# Patient Record
Sex: FEMALE | Race: WHITE | NOT HISPANIC OR LATINO | Employment: FULL TIME | URBAN - METROPOLITAN AREA
[De-identification: names, ages, dates, MRNs, and addresses within clinical notes are randomized per-mention and may not be internally consistent; named-entity substitution may affect disease eponyms.]

---

## 2017-04-18 ENCOUNTER — TELEPHONE (OUTPATIENT)
Dept: FAMILY MEDICINE | Facility: CLINIC | Age: 52
End: 2017-04-18

## 2017-08-09 DIAGNOSIS — J20.9 ACUTE BRONCHITIS, UNSPECIFIED ORGANISM: ICD-10-CM

## 2017-08-09 RX ORDER — FLUTICASONE FUROATE AND VILANTEROL TRIFENATATE 100; 25 UG/1; UG/1
POWDER RESPIRATORY (INHALATION)
Qty: 60 EACH | Refills: 2 | Status: SHIPPED | OUTPATIENT
Start: 2017-08-09 | End: 2017-11-27 | Stop reason: SDUPTHER

## 2017-08-24 ENCOUNTER — OFFICE VISIT (OUTPATIENT)
Dept: FAMILY MEDICINE | Facility: CLINIC | Age: 52
End: 2017-08-24
Payer: COMMERCIAL

## 2017-08-24 ENCOUNTER — LAB VISIT (OUTPATIENT)
Dept: LAB | Facility: HOSPITAL | Age: 52
End: 2017-08-24
Attending: FAMILY MEDICINE
Payer: COMMERCIAL

## 2017-08-24 VITALS
TEMPERATURE: 98 F | BODY MASS INDEX: 18.16 KG/M2 | SYSTOLIC BLOOD PRESSURE: 154 MMHG | HEART RATE: 86 BPM | HEIGHT: 59 IN | DIASTOLIC BLOOD PRESSURE: 92 MMHG | OXYGEN SATURATION: 99 % | WEIGHT: 90.06 LBS

## 2017-08-24 DIAGNOSIS — F41.8 DEPRESSION WITH ANXIETY: ICD-10-CM

## 2017-08-24 DIAGNOSIS — I10 ESSENTIAL HYPERTENSION: ICD-10-CM

## 2017-08-24 DIAGNOSIS — J30.1 CHRONIC ALLERGIC RHINITIS DUE TO POLLEN, UNSPECIFIED SEASONALITY: ICD-10-CM

## 2017-08-24 DIAGNOSIS — J45.30 MILD PERSISTENT ASTHMA WITHOUT COMPLICATION: ICD-10-CM

## 2017-08-24 DIAGNOSIS — I10 ESSENTIAL HYPERTENSION: Primary | ICD-10-CM

## 2017-08-24 DIAGNOSIS — Z72.0 TOBACCO ABUSE: ICD-10-CM

## 2017-08-24 LAB
ALBUMIN SERPL BCP-MCNC: 4.1 G/DL
ALP SERPL-CCNC: 82 U/L
ALT SERPL W/O P-5'-P-CCNC: 17 U/L
ANION GAP SERPL CALC-SCNC: 10 MMOL/L
AST SERPL-CCNC: 20 U/L
BASOPHILS # BLD AUTO: 0.04 K/UL
BASOPHILS NFR BLD: 0.4 %
BILIRUB SERPL-MCNC: 0.5 MG/DL
BUN SERPL-MCNC: 9 MG/DL
CALCIUM SERPL-MCNC: 9.4 MG/DL
CHLORIDE SERPL-SCNC: 107 MMOL/L
CO2 SERPL-SCNC: 24 MMOL/L
CREAT SERPL-MCNC: 0.7 MG/DL
DIFFERENTIAL METHOD: ABNORMAL
EOSINOPHIL # BLD AUTO: 0.2 K/UL
EOSINOPHIL NFR BLD: 1.9 %
ERYTHROCYTE [DISTWIDTH] IN BLOOD BY AUTOMATED COUNT: 12.9 %
EST. GFR  (AFRICAN AMERICAN): >60 ML/MIN/1.73 M^2
EST. GFR  (NON AFRICAN AMERICAN): >60 ML/MIN/1.73 M^2
GLUCOSE SERPL-MCNC: 81 MG/DL
HCT VFR BLD AUTO: 46.2 %
HGB BLD-MCNC: 16.2 G/DL
LYMPHOCYTES # BLD AUTO: 1.8 K/UL
LYMPHOCYTES NFR BLD: 19.6 %
MCH RBC QN AUTO: 32.7 PG
MCHC RBC AUTO-ENTMCNC: 35.1 G/DL
MCV RBC AUTO: 93 FL
MONOCYTES # BLD AUTO: 0.6 K/UL
MONOCYTES NFR BLD: 6.8 %
NEUTROPHILS # BLD AUTO: 6.6 K/UL
NEUTROPHILS NFR BLD: 71 %
PLATELET # BLD AUTO: 401 K/UL
PMV BLD AUTO: 9.6 FL
POTASSIUM SERPL-SCNC: 3.8 MMOL/L
PROT SERPL-MCNC: 7.1 G/DL
RBC # BLD AUTO: 4.95 M/UL
SODIUM SERPL-SCNC: 141 MMOL/L
TSH SERPL DL<=0.005 MIU/L-ACNC: 1.07 UIU/ML
WBC # BLD AUTO: 9.35 K/UL

## 2017-08-24 PROCEDURE — 3008F BODY MASS INDEX DOCD: CPT | Mod: S$GLB,,, | Performed by: FAMILY MEDICINE

## 2017-08-24 PROCEDURE — 85025 COMPLETE CBC W/AUTO DIFF WBC: CPT

## 2017-08-24 PROCEDURE — 84443 ASSAY THYROID STIM HORMONE: CPT

## 2017-08-24 PROCEDURE — 36415 COLL VENOUS BLD VENIPUNCTURE: CPT | Mod: PO

## 2017-08-24 PROCEDURE — 99214 OFFICE O/P EST MOD 30 MIN: CPT | Mod: S$GLB,,, | Performed by: FAMILY MEDICINE

## 2017-08-24 PROCEDURE — 80053 COMPREHEN METABOLIC PANEL: CPT

## 2017-08-24 PROCEDURE — 99999 PR PBB SHADOW E&M-EST. PATIENT-LVL III: CPT | Mod: PBBFAC,,, | Performed by: FAMILY MEDICINE

## 2017-08-24 PROCEDURE — 3080F DIAST BP >= 90 MM HG: CPT | Mod: S$GLB,,, | Performed by: FAMILY MEDICINE

## 2017-08-24 PROCEDURE — 3077F SYST BP >= 140 MM HG: CPT | Mod: S$GLB,,, | Performed by: FAMILY MEDICINE

## 2017-08-24 RX ORDER — AMLODIPINE BESYLATE 5 MG/1
5 TABLET ORAL DAILY
Qty: 30 TABLET | Refills: 1 | Status: SHIPPED | OUTPATIENT
Start: 2017-08-24 | End: 2017-11-27 | Stop reason: SDUPTHER

## 2017-08-24 RX ORDER — ALBUTEROL SULFATE 90 UG/1
2 AEROSOL, METERED RESPIRATORY (INHALATION) EVERY 6 HOURS PRN
Qty: 18 G | Refills: 5 | Status: SHIPPED | OUTPATIENT
Start: 2017-08-24 | End: 2017-11-27

## 2017-08-24 RX ORDER — LORATADINE 10 MG/1
10 TABLET ORAL DAILY
Refills: 0 | COMMUNITY
Start: 2017-08-24 | End: 2017-08-24

## 2017-08-24 RX ORDER — SERTRALINE HYDROCHLORIDE 50 MG/1
50 TABLET, FILM COATED ORAL DAILY
Qty: 30 TABLET | Refills: 1 | Status: SHIPPED | OUTPATIENT
Start: 2017-08-24 | End: 2017-11-27 | Stop reason: SDUPTHER
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
## 2017-11-27 ENCOUNTER — OFFICE VISIT (OUTPATIENT)
Dept: FAMILY MEDICINE | Facility: CLINIC | Age: 52
End: 2017-11-27
Payer: COMMERCIAL

## 2017-11-27 VITALS
HEIGHT: 59 IN | BODY MASS INDEX: 19.91 KG/M2 | OXYGEN SATURATION: 98 % | HEART RATE: 76 BPM | DIASTOLIC BLOOD PRESSURE: 86 MMHG | SYSTOLIC BLOOD PRESSURE: 134 MMHG | WEIGHT: 98.75 LBS | TEMPERATURE: 98 F

## 2017-11-27 DIAGNOSIS — J45.30 MILD PERSISTENT ASTHMA WITHOUT COMPLICATION: ICD-10-CM

## 2017-11-27 DIAGNOSIS — R41.840 POOR CONCENTRATION: ICD-10-CM

## 2017-11-27 DIAGNOSIS — F41.8 DEPRESSION WITH ANXIETY: ICD-10-CM

## 2017-11-27 DIAGNOSIS — I10 ESSENTIAL HYPERTENSION: Primary | ICD-10-CM

## 2017-11-27 PROCEDURE — 99999 PR PBB SHADOW E&M-EST. PATIENT-LVL III: CPT | Mod: PBBFAC,,, | Performed by: FAMILY MEDICINE

## 2017-11-27 PROCEDURE — 99214 OFFICE O/P EST MOD 30 MIN: CPT | Mod: S$GLB,,, | Performed by: FAMILY MEDICINE

## 2017-11-27 RX ORDER — SERTRALINE HYDROCHLORIDE 50 MG/1
50 TABLET, FILM COATED ORAL DAILY
Qty: 30 TABLET | Refills: 2 | Status: SHIPPED | OUTPATIENT
Start: 2017-11-27 | End: 2018-03-07 | Stop reason: SDUPTHER

## 2017-11-27 RX ORDER — FLUTICASONE FUROATE AND VILANTEROL TRIFENATATE 100; 25 UG/1; UG/1
1 POWDER RESPIRATORY (INHALATION) DAILY
Qty: 60 EACH | Refills: 2 | Status: SHIPPED | OUTPATIENT
Start: 2017-11-27 | End: 2018-03-07 | Stop reason: SDUPTHER

## 2017-11-27 RX ORDER — AMLODIPINE BESYLATE 5 MG/1
5 TABLET ORAL DAILY
Qty: 30 TABLET | Refills: 2 | Status: SHIPPED | OUTPATIENT
Start: 2017-11-27 | End: 2018-03-07 | Stop reason: SDUPTHER

## 2017-11-27 RX ORDER — FLUTICASONE PROPIONATE 50 MCG
2 SPRAY, SUSPENSION (ML) NASAL DAILY PRN
Qty: 16 G | Refills: 5 | Status: SHIPPED | OUTPATIENT
Start: 2017-11-27 | End: 2018-03-07 | Stop reason: SDUPTHER

## 2017-11-27 RX ORDER — ALBUTEROL SULFATE 90 UG/1
2 AEROSOL, METERED RESPIRATORY (INHALATION) EVERY 6 HOURS PRN
Qty: 1 INHALER | Refills: 11 | Status: SHIPPED | OUTPATIENT
Start: 2017-11-27 | End: 2017-12-28 | Stop reason: SDUPTHER

## 2017-11-27 NOTE — PROGRESS NOTES
Subjective:       Patient ID: Any Coulter is a 52 y.o. female.    Chief Complaint: Medication Refill      HPI Comments:       Current Outpatient Prescriptions:     amLODIPine (NORVASC) 5 MG tablet, Take 1 tablet (5 mg total) by mouth once daily., Disp: 30 tablet, Rfl: 2    BREO ELLIPTA 100-25 mcg/dose diskus inhaler, Inhale 1 puff into the lungs once daily. Controller, Disp: 60 each, Rfl: 2    clotrimazole-betamethasone 1-0.05% (LOTRISONE) cream, , Disp: , Rfl:     fluticasone (FLONASE) 50 mcg/actuation nasal spray, 2 sprays by Each Nare route daily as needed for Rhinitis., Disp: 16 g, Rfl: 5    hydrocodone-acetaminophen 7.5-325mg (NORCO) 7.5-325 mg per tablet, , Disp: , Rfl:     sertraline (ZOLOFT) 50 MG tablet, Take 1 tablet (50 mg total) by mouth once daily., Disp: 30 tablet, Rfl: 2    SUPREP BOWEL PREP KIT 17.5-3.13-1.6 gram SolR, , Disp: , Rfl:     albuterol 90 mcg/actuation inhaler, Inhale 2 puffs into the lungs every 6 (six) hours as needed for Wheezing. Dispense with spacer., Disp: 1 Inhaler, Rfl: 11      Three-month follow-up.  Patient says she just ran out of her medications even though I saw her approximately 100 days ago she had a 60 day supply of everything.  Says the Zoloft helped her but that she couldn't take the full dose together because it made her sleepy the next morning.  So she broke it  Half, and has been doing a better while taking half tablet in the morning and half tablet in the evening.  She says her moodiness and her crying were better.  She is now back with her .  Today she complains of problems with concentration and focus for the last 5 years which interferes with her ability to perform her job accurately as a .    She's also requesting refills on her albuterol.  She is asking for pro-air by name she feels like it works better than Ventolin.  Also needs refill on her breo, which she uses every day once a day.  She has been tolerating the Norvasc, but of  "course she ran out a few weeks ago according to my records      Review of Systems   Constitutional: Negative for activity change, appetite change and fever.   HENT: Negative for sore throat.    Respiratory: Negative for cough and shortness of breath.    Cardiovascular: Negative for chest pain.   Gastrointestinal: Negative for abdominal pain, diarrhea and nausea.   Genitourinary: Negative for difficulty urinating.   Musculoskeletal: Negative for arthralgias and myalgias.   Neurological: Negative for dizziness and headaches.   Psychiatric/Behavioral: Positive for decreased concentration. Negative for dysphoric mood, self-injury, sleep disturbance and suicidal ideas. The patient is not nervous/anxious.        Objective:      Vitals:    11/27/17 1457   BP: 134/86   Pulse: 76   Temp: 98.1 °F (36.7 °C)   TempSrc: Tympanic   SpO2: 98%   Weight: 44.8 kg (98 lb 12.3 oz)   Height: 4' 11" (1.499 m)   PainSc: 0-No pain     Physical Exam   Constitutional: She is oriented to person, place, and time. She appears well-developed and well-nourished. No distress.   HENT:   Head: Normocephalic.   Mouth/Throat: No oropharyngeal exudate.   Neck: Neck supple. No thyromegaly present.   Cardiovascular: Normal rate, regular rhythm and normal heart sounds.    No murmur heard.  Pulmonary/Chest: Effort normal and breath sounds normal. She has no wheezes. She has no rales.   Abdominal: Soft. She exhibits no distension.   Musculoskeletal: She exhibits no edema.   Lymphadenopathy:     She has no cervical adenopathy.   Neurological: She is alert and oriented to person, place, and time.   Skin: Skin is warm and dry. She is not diaphoretic.   Psychiatric: She has a normal mood and affect. Her behavior is normal. Judgment and thought content normal.   Nursing note and vitals reviewed.      Assessment:       1. Essential hypertension    2. Depression with anxiety    3. Mild persistent asthma without complication    4. Poor concentration        Plan: "   Essential hypertension  Comments:  Refill amlodipine.  Follow-up in 3 months    Depression with anxiety  Comments:  Doing better with Zoloft.  Refill for 3 months.  Follow-up then    Mild persistent asthma without complication  Comments:  Stable on breo daily, and beta agonist prn  Orders:  -     BREO ELLIPTA 100-25 mcg/dose diskus inhaler; Inhale 1 puff into the lungs once daily. Controller  Dispense: 60 each; Refill: 2  -     fluticasone (FLONASE) 50 mcg/actuation nasal spray; 2 sprays by Each Nare route daily as needed for Rhinitis.  Dispense: 16 g; Refill: 5    Poor concentration  Comments:  Contact Information on psychiatric evaluation given    Other orders  -     albuterol 90 mcg/actuation inhaler; Inhale 2 puffs into the lungs every 6 (six) hours as needed for Wheezing. Dispense with spacer.  Dispense: 1 Inhaler; Refill: 11  -     sertraline (ZOLOFT) 50 MG tablet; Take 1 tablet (50 mg total) by mouth once daily.  Dispense: 30 tablet; Refill: 2  -     amLODIPine (NORVASC) 5 MG tablet; Take 1 tablet (5 mg total) by mouth once daily.  Dispense: 30 tablet; Refill: 2

## 2017-12-28 ENCOUNTER — HOSPITAL ENCOUNTER (OUTPATIENT)
Dept: RADIOLOGY | Facility: HOSPITAL | Age: 52
Discharge: HOME | End: 2017-12-28
Attending: NURSE PRACTITIONER
Payer: COMMERCIAL

## 2017-12-28 ENCOUNTER — OFFICE VISIT (OUTPATIENT)
Dept: FAMILY MEDICINE | Facility: CLINIC | Age: 52
End: 2017-12-28
Payer: COMMERCIAL

## 2017-12-28 VITALS
RESPIRATION RATE: 18 BRPM | HEIGHT: 59 IN | SYSTOLIC BLOOD PRESSURE: 131 MMHG | OXYGEN SATURATION: 99 % | WEIGHT: 102.63 LBS | HEART RATE: 75 BPM | DIASTOLIC BLOOD PRESSURE: 83 MMHG | BODY MASS INDEX: 20.69 KG/M2 | TEMPERATURE: 99 F

## 2017-12-28 DIAGNOSIS — J20.9 ACUTE BRONCHITIS, UNSPECIFIED ORGANISM: Primary | ICD-10-CM

## 2017-12-28 DIAGNOSIS — Z72.0 TOBACCO ABUSE: ICD-10-CM

## 2017-12-28 DIAGNOSIS — J20.9 ACUTE BRONCHITIS, UNSPECIFIED ORGANISM: ICD-10-CM

## 2017-12-28 LAB
CTP QC/QA: YES
FLUAV AG NPH QL: NEGATIVE
FLUBV AG NPH QL: NEGATIVE

## 2017-12-28 PROCEDURE — 99999 PR PBB SHADOW E&M-EST. PATIENT-LVL III: CPT | Mod: PBBFAC,,, | Performed by: NURSE PRACTITIONER

## 2017-12-28 PROCEDURE — 94640 AIRWAY INHALATION TREATMENT: CPT | Mod: S$GLB,,, | Performed by: FAMILY MEDICINE

## 2017-12-28 PROCEDURE — 71020 XR CHEST PA AND LATERAL: CPT | Mod: TC,PO

## 2017-12-28 PROCEDURE — 71020 XR CHEST PA AND LATERAL: CPT | Mod: 26,,, | Performed by: RADIOLOGY

## 2017-12-28 PROCEDURE — 96372 THER/PROPH/DIAG INJ SC/IM: CPT | Mod: 59,S$GLB,, | Performed by: FAMILY MEDICINE

## 2017-12-28 PROCEDURE — 87804 INFLUENZA ASSAY W/OPTIC: CPT | Mod: QW,S$GLB,, | Performed by: NURSE PRACTITIONER

## 2017-12-28 PROCEDURE — 99213 OFFICE O/P EST LOW 20 MIN: CPT | Mod: 25,S$GLB,, | Performed by: NURSE PRACTITIONER

## 2017-12-28 RX ORDER — ALBUTEROL SULFATE 0.83 MG/ML
2.5 SOLUTION RESPIRATORY (INHALATION)
Status: COMPLETED | OUTPATIENT
Start: 2017-12-28 | End: 2017-12-28

## 2017-12-28 RX ORDER — ALBUTEROL SULFATE 90 UG/1
2 AEROSOL, METERED RESPIRATORY (INHALATION) EVERY 6 HOURS PRN
Qty: 1 INHALER | Refills: 11 | Status: SHIPPED | OUTPATIENT
Start: 2017-12-28 | End: 2018-03-07

## 2017-12-28 RX ORDER — KETOROLAC TROMETHAMINE 30 MG/ML
60 INJECTION, SOLUTION INTRAMUSCULAR; INTRAVENOUS
Status: COMPLETED | OUTPATIENT
Start: 2017-12-28 | End: 2017-12-28

## 2017-12-28 RX ORDER — PREDNISONE 20 MG/1
TABLET ORAL
Qty: 20 TABLET | Refills: 0 | Status: SHIPPED | OUTPATIENT
Start: 2017-12-28

## 2017-12-28 RX ADMIN — KETOROLAC TROMETHAMINE 60 MG: 30 INJECTION, SOLUTION INTRAMUSCULAR; INTRAVENOUS at 05:12

## 2017-12-28 RX ADMIN — ALBUTEROL SULFATE 2.5 MG: 0.83 SOLUTION RESPIRATORY (INHALATION) at 05:12

## 2017-12-29 NOTE — PROGRESS NOTES
"CC:   Chief Complaint   Patient presents with    Sore Throat    Cough    Ear Fullness     HPI: This is a new problem.   Any Coulter is a 52 y.o. female with a complaint of URI.  The current episode started in the past 5 days.   The problem has been gradually worsening.   Associated symptoms included chills, nasal congestion, cough, myalgia.    Pertinent negatives include chest pain, dyspnea   Treatments tried: none has been used and this has provided no relief.     [unfilled]  Outpatient Medications Prior to Visit   Medication Sig Dispense Refill    amLODIPine (NORVASC) 5 MG tablet Take 1 tablet (5 mg total) by mouth once daily. 30 tablet 2    BREO ELLIPTA 100-25 mcg/dose diskus inhaler Inhale 1 puff into the lungs once daily. Controller 60 each 2    fluticasone (FLONASE) 50 mcg/actuation nasal spray 2 sprays by Each Nare route daily as needed for Rhinitis. 16 g 5    clotrimazole-betamethasone 1-0.05% (LOTRISONE) cream       hydrocodone-acetaminophen 7.5-325mg (NORCO) 7.5-325 mg per tablet       sertraline (ZOLOFT) 50 MG tablet Take 1 tablet (50 mg total) by mouth once daily. 30 tablet 2    albuterol 90 mcg/actuation inhaler Inhale 2 puffs into the lungs every 6 (six) hours as needed for Wheezing. Dispense with spacer. 1 Inhaler 11    SUPREP BOWEL PREP KIT 17.5-3.13-1.6 gram SolR        No facility-administered medications prior to visit.         Physical Exam   /83   Pulse 75   Temp 99 °F (37.2 °C) (Tympanic)   Resp 18   Ht 4' 11" (1.499 m)   Wt 46.6 kg (102 lb 10 oz)   SpO2 99%   BMI 20.73 kg/m²   Constitutional: The patient appears well-developed and well-nourished.   Head: Normocephalic and atraumatic.   Right Ear: Tympanic membrane and ear canal normal. No drainage, swelling or tenderness. Tympanic membrane is not injected, not erythematous and not bulging.   Left Ear: Ear canal normal. No drainage, swelling or tenderness. Tympanic membrane is not injected, not erythematous and not " bulging.   Nose: Mucosal edema and rhinorrhea present. No sinus tenderness on palpation  Mouth/Throat: Uvula is midline. Posterior oropharyngeal erythema present. No oropharyngeal exudate.        THE MUCOSA IS BOGGY AND ERYTHEMATOUS.     Eyes: Conjunctivae normal and lids are normal. Pupils are equal, round, and reactive to light. Right eye exhibits no discharge. Left eye exhibits no discharge. Right eye exhibits normal extraocular motion. Left eye exhibits normal extraocular motion.   Neck: Trachea normal and normal range of motion. Neck supple. No tracheal tenderness present. No mass and no thyromegaly present.   Cardiovascular: Normal rate, regular rhythm, S1 normal, S2 normal and normal heart sounds.  Exam reveals no gallop, no S3, no S4 and no friction rub.    No murmur heard.  Pulmonary/Chest: Effort normal and breath sounds normal. No stridor. Not tachypneic. No respiratory distress. The patient has no wheezes. The patient has no rhonchi. The patient has no rales.   Skin: The patient is not diaphoretic.     Encounter Diagnoses   Name Primary?    Acute bronchitis, unspecified organism Yes    Tobacco abuse        PLAN:    Any was seen today for sore throat, cough and ear fullness.    Diagnoses and all orders for this visit:    Acute bronchitis, unspecified organism  -     X-Ray Chest PA And Lateral; Future  -     albuterol nebulizer solution 2.5 mg; Take 3 mLs (2.5 mg total) by nebulization one time.  -     albuterol 90 mcg/actuation inhaler; Inhale 2 puffs into the lungs every 6 (six) hours as needed for Wheezing. Dispense with spacer.  -     predniSONE (DELTASONE) 20 MG tablet; Take 3 tab daily for 3 days; then take 2 tab daily for 3 day; then take 1 tab daily for 3 days; then take 0.5 tab daily for 4 days  -     POCT Influenza A/B  -     ketorolac injection 60 mg; Inject 2 mLs (60 mg total) into the muscle one time.  -symptomatic treatment discussed. Verbalized understanding  Tobacco abuse  -smoking  "cessation      Medications Ordered This Encounter      albuterol 90 mcg/actuation inhaler          Sig: Inhale 2 puffs into the lungs every 6 (six) hours as needed for Wheezing. Dispense with spacer.          Dispense:  1 Inhaler          Refill:  11          Pt requests "PROAIR"      albuterol nebulizer solution 2.5 mg      ketorolac injection 60 mg      predniSONE (DELTASONE) 20 MG tablet          Sig: Take 3 tab daily for 3 days; then take 2 tab daily for 3 day; then take 1 tab daily for 3 days; then take 0.5 tab daily for 4 days          Dispense:  20 tablet          Refill:  0  Orders Placed This Encounter   Procedures    X-Ray Chest PA And Lateral     Standing Status:   Future     Number of Occurrences:   1     Standing Expiration Date:   12/28/2018     Order Specific Question:   May the Radiologist modify the order per protocol to meet the clinical needs of the patient?     Answer:   Yes    POCT Influenza A/B     RTC if symptoms are worsening or changing significantly or if not improved by the end of therapy.    "

## 2018-03-01 ENCOUNTER — TELEPHONE (OUTPATIENT)
Dept: FAMILY MEDICINE | Facility: CLINIC | Age: 53
End: 2018-03-01

## 2018-03-07 ENCOUNTER — OFFICE VISIT (OUTPATIENT)
Dept: FAMILY MEDICINE | Facility: CLINIC | Age: 53
End: 2018-03-07
Payer: COMMERCIAL

## 2018-03-07 ENCOUNTER — TELEPHONE (OUTPATIENT)
Dept: FAMILY MEDICINE | Facility: CLINIC | Age: 53
End: 2018-03-07

## 2018-03-07 VITALS
OXYGEN SATURATION: 99 % | HEART RATE: 87 BPM | BODY MASS INDEX: 19.98 KG/M2 | HEIGHT: 59 IN | DIASTOLIC BLOOD PRESSURE: 84 MMHG | SYSTOLIC BLOOD PRESSURE: 140 MMHG | WEIGHT: 99.13 LBS | TEMPERATURE: 99 F

## 2018-03-07 DIAGNOSIS — J45.30 MILD PERSISTENT ASTHMA WITHOUT COMPLICATION: ICD-10-CM

## 2018-03-07 DIAGNOSIS — F41.8 DEPRESSION WITH ANXIETY: ICD-10-CM

## 2018-03-07 DIAGNOSIS — J20.9 ACUTE BRONCHITIS, UNSPECIFIED ORGANISM: Primary | ICD-10-CM

## 2018-03-07 DIAGNOSIS — Z72.0 TOBACCO ABUSE: ICD-10-CM

## 2018-03-07 DIAGNOSIS — I10 ESSENTIAL HYPERTENSION: ICD-10-CM

## 2018-03-07 PROCEDURE — 99214 OFFICE O/P EST MOD 30 MIN: CPT | Mod: 25,S$GLB,, | Performed by: FAMILY MEDICINE

## 2018-03-07 PROCEDURE — 3077F SYST BP >= 140 MM HG: CPT | Mod: S$GLB,,, | Performed by: FAMILY MEDICINE

## 2018-03-07 PROCEDURE — 3079F DIAST BP 80-89 MM HG: CPT | Mod: S$GLB,,, | Performed by: FAMILY MEDICINE

## 2018-03-07 PROCEDURE — 99999 PR PBB SHADOW E&M-EST. PATIENT-LVL III: CPT | Mod: PBBFAC,,, | Performed by: FAMILY MEDICINE

## 2018-03-07 PROCEDURE — 96372 THER/PROPH/DIAG INJ SC/IM: CPT | Mod: 59,S$GLB,, | Performed by: FAMILY MEDICINE

## 2018-03-07 RX ORDER — FLUTICASONE FUROATE AND VILANTEROL TRIFENATATE 100; 25 UG/1; UG/1
1 POWDER RESPIRATORY (INHALATION) DAILY
Qty: 60 EACH | Refills: 2 | Status: SHIPPED | OUTPATIENT
Start: 2018-03-07

## 2018-03-07 RX ORDER — FLUTICASONE PROPIONATE 50 MCG
2 SPRAY, SUSPENSION (ML) NASAL DAILY PRN
Qty: 16 G | Refills: 5 | Status: SHIPPED | OUTPATIENT
Start: 2018-03-07

## 2018-03-07 RX ORDER — LORATADINE 10 MG/1
10 TABLET ORAL DAILY
Qty: 30 TABLET | Refills: 1 | COMMUNITY
Start: 2018-03-07 | End: 2019-03-07

## 2018-03-07 RX ORDER — BETAMETHASONE SODIUM PHOSPHATE AND BETAMETHASONE ACETATE 3; 3 MG/ML; MG/ML
6 INJECTION, SUSPENSION INTRA-ARTICULAR; INTRALESIONAL; INTRAMUSCULAR; SOFT TISSUE
Status: COMPLETED | OUTPATIENT
Start: 2018-03-07 | End: 2018-03-07

## 2018-03-07 RX ORDER — AMLODIPINE BESYLATE 5 MG/1
5 TABLET ORAL DAILY
Qty: 30 TABLET | Refills: 2 | Status: SHIPPED | OUTPATIENT
Start: 2018-03-07 | End: 2019-03-07

## 2018-03-07 RX ORDER — PROMETHAZINE HYDROCHLORIDE AND DEXTROMETHORPHAN HYDROBROMIDE 6.25; 15 MG/5ML; MG/5ML
5 SYRUP ORAL 3 TIMES DAILY PRN
Qty: 118 ML | Refills: 0 | Status: SHIPPED | OUTPATIENT
Start: 2018-03-07 | End: 2018-03-17

## 2018-03-07 RX ORDER — AMOXICILLIN 875 MG/1
875 TABLET, FILM COATED ORAL 2 TIMES DAILY
Qty: 20 TABLET | Refills: 0 | Status: SHIPPED | OUTPATIENT
Start: 2018-03-07 | End: 2018-03-17

## 2018-03-07 RX ORDER — ALBUTEROL SULFATE 90 UG/1
2 AEROSOL, METERED RESPIRATORY (INHALATION) EVERY 6 HOURS PRN
Qty: 18 G | Refills: 0 | Status: SHIPPED | OUTPATIENT
Start: 2018-03-07 | End: 2019-03-07

## 2018-03-07 RX ORDER — SERTRALINE HYDROCHLORIDE 50 MG/1
50 TABLET, FILM COATED ORAL DAILY
Qty: 30 TABLET | Refills: 2 | Status: SHIPPED | OUTPATIENT
Start: 2018-03-07 | End: 2019-03-07

## 2018-03-07 RX ADMIN — BETAMETHASONE SODIUM PHOSPHATE AND BETAMETHASONE ACETATE 6 MG: 3; 3 INJECTION, SUSPENSION INTRA-ARTICULAR; INTRALESIONAL; INTRAMUSCULAR; SOFT TISSUE at 02:03

## 2018-03-07 NOTE — LETTER
[facility]  [address] [address]  Phone: [phone]  Fax: [phone] March 7, 2018     Patient: Any Coulter    YOB: 1965   Date of Visit: 3/7/2018       To Whom It May Concern:     Dr. Duncan Ojeda is requesting a copy of pt's medical record  Please fax records to [phone]    If you have any questions or concerns, please don't hesitate to call.    Sincerely,  Brianna K., [facility]  Phone #[phone] Fax # [phone]

## 2018-03-07 NOTE — PROGRESS NOTES
Subjective:       Patient ID: Any Coulter is a 53 y.o. female.    Chief Complaint: Sinus Problem      HPI Comments:       Current Outpatient Prescriptions:     amLODIPine (NORVASC) 5 MG tablet, Take 1 tablet (5 mg total) by mouth once daily., Disp: 30 tablet, Rfl: 2    BREO ELLIPTA 100-25 mcg/dose diskus inhaler, Inhale 1 puff into the lungs once daily. Controller, Disp: 60 each, Rfl: 2    clotrimazole-betamethasone 1-0.05% (LOTRISONE) cream, , Disp: , Rfl:     fluticasone (FLONASE) 50 mcg/actuation nasal spray, 2 sprays (100 mcg total) by Each Nare route daily as needed for Rhinitis., Disp: 16 g, Rfl: 5    hydrocodone-acetaminophen 7.5-325mg (NORCO) 7.5-325 mg per tablet, , Disp: , Rfl:     predniSONE (DELTASONE) 20 MG tablet, Take 3 tab daily for 3 days; then take 2 tab daily for 3 day; then take 1 tab daily for 3 days; then take 0.5 tab daily for 4 days, Disp: 20 tablet, Rfl: 0    sertraline (ZOLOFT) 50 MG tablet, Take 1 tablet (50 mg total) by mouth once daily., Disp: 30 tablet, Rfl: 2    albuterol 90 mcg/actuation inhaler, Inhale 2 puffs into the lungs every 6 (six) hours as needed for Wheezing. Rescue, Disp: 18 g, Rfl: 0    loratadine (CLARITIN) 10 mg tablet, Take 1 tablet (10 mg total) by mouth once daily., Disp: 30 tablet, Rfl: 1      She is a smoker who has almost continuous respiratory symptoms, stating that she's not been asymptomatic since seeing me in November or seeing another provider here for respiratory infection in December.  However the last 5 days she's had increased coughing and congestion with green rhythm, low-grade fever.  Needs refills on some medications.      Review of Systems   Constitutional: Positive for fatigue. Negative for activity change, appetite change and fever.   HENT: Positive for congestion, postnasal drip, rhinorrhea and sinus pressure. Negative for sore throat.    Respiratory: Positive for cough. Negative for shortness of breath.    Cardiovascular: Negative for  "chest pain.   Gastrointestinal: Negative for abdominal pain, diarrhea and nausea.   Genitourinary: Negative for difficulty urinating.   Musculoskeletal: Negative for arthralgias and myalgias.   Neurological: Negative for dizziness and headaches.       Objective:      Vitals:    03/07/18 1412 03/07/18 1431   BP: (!) 144/90 (!) 140/84   Pulse: 87    Temp: 99.1 °F (37.3 °C)    TempSrc: Tympanic    SpO2: 99%    Weight: 45 kg (99 lb 1.6 oz)    Height: 4' 11" (1.499 m)      Physical Exam   Constitutional: She is oriented to person, place, and time. She appears well-developed and well-nourished.  Non-toxic appearance. She appears ill. No distress.   HENT:   Head: Normocephalic.   Right Ear: Tympanic membrane, external ear and ear canal normal.   Left Ear: Tympanic membrane, external ear and ear canal normal.   Nose: Mucosal edema present. No rhinorrhea.   Mouth/Throat: Mucous membranes are dry. Posterior oropharyngeal edema present. No oropharyngeal exudate or posterior oropharyngeal erythema.   Neck: Neck supple. No thyromegaly present.   Cardiovascular: Normal rate, regular rhythm and normal heart sounds.    No murmur heard.  Pulmonary/Chest: Effort normal. She has wheezes. She has no rales.   Abdominal: Soft. She exhibits no distension.   Musculoskeletal: She exhibits no edema.   Lymphadenopathy:     She has no cervical adenopathy.   Neurological: She is alert and oriented to person, place, and time.   Skin: Skin is warm and dry. She is not diaphoretic.   Psychiatric: She has a normal mood and affect. Her behavior is normal. Judgment and thought content normal.   Nursing note and vitals reviewed.      Assessment:       1. Acute bronchitis, unspecified organism    2. Mild persistent asthma without complication    3. Tobacco abuse    4. Depression with anxiety    5. Essential hypertension        Plan:   Acute bronchitis, unspecified organism  Comments:  Amoxicillin Celestone.  Decrease smoking    Mild persistent asthma " without complication  Comments:  Occasional wheeze on exam now.  No exacerbation currently.  Refills on Brio and pro-air (patient requests namebrand on the latter)  Orders:  -     fluticasone (FLONASE) 50 mcg/actuation nasal spray; 2 sprays (100 mcg total) by Each Nare route daily as needed for Rhinitis.  Dispense: 16 g; Refill: 5  -     BREO ELLIPTA 100-25 mcg/dose diskus inhaler; Inhale 1 puff into the lungs once daily. Controller  Dispense: 60 each; Refill: 2    Tobacco abuse  Comments:  Assistance offered.  We'll discuss further at annual physical in 1 month    Depression with anxiety  Comments:  Fair control.  Refill Zoloft    Essential hypertension  Comments:  High today.  Will follow-up in one month.  We'll likely need increase in medication    Other orders  -     loratadine (CLARITIN) 10 mg tablet; Take 1 tablet (10 mg total) by mouth once daily.  Dispense: 30 tablet; Refill: 1  -     albuterol 90 mcg/actuation inhaler; Inhale 2 puffs into the lungs every 6 (six) hours as needed for Wheezing. Rescue  Dispense: 18 g; Refill: 0  -     betamethasone acetate-betamethasone sodium phosphate injection 6 mg; Inject 1 mL (6 mg total) into the muscle one time.  -     amoxicillin (AMOXIL) 875 MG tablet; Take 1 tablet (875 mg total) by mouth 2 (two) times daily.  Dispense: 20 tablet; Refill: 0  -     amLODIPine (NORVASC) 5 MG tablet; Take 1 tablet (5 mg total) by mouth once daily.  Dispense: 30 tablet; Refill: 2  -     sertraline (ZOLOFT) 50 MG tablet; Take 1 tablet (50 mg total) by mouth once daily.  Dispense: 30 tablet; Refill: 2  -     promethazine-dextromethorphan (PROMETHAZINE-DM) 6.25-15 mg/5 mL Syrp; Take 5 mLs by mouth 3 (three) times daily as needed.  Dispense: 118 mL; Refill: 0

## 2018-03-13 ENCOUNTER — TELEPHONE (OUTPATIENT)
Dept: FAMILY MEDICINE | Facility: CLINIC | Age: 53
End: 2018-03-13

## 2018-03-13 NOTE — LETTER
March 13, 2018    Any Coulter  [address] [place] [address]             [facility]  [address] [place] [address]  Phone: [phone]  Fax: [phone] March 13, 2018    Patient : Any Coulter  YOB: 1965  Date of Visit : 03/07/2018    To Whom It May Concern :     Dr. Ojeda is requesting a copy of the pts entire record.  Please fax records to [phone]    If you have any questions or concerns, please dont hesitate to call.    Sincerely,  Bryanna C. [facility]  Phone # : [phone] Fax #: [phone]

## 2018-06-21 ENCOUNTER — PATIENT OUTREACH (OUTPATIENT)
Dept: ADMINISTRATIVE | Facility: HOSPITAL | Age: 53
End: 2018-06-21

## 2018-06-21 NOTE — PROGRESS NOTES
I spoke with pt that stated she no longer has insurance and can no longer afford to see Dr Ojeda. Pt stated she had an inncendent with a co-worker and was fired over it. Pt stated since then she has felt much better. Pt will seek care with [facility]. Pt stated we can remove Dr Ojeda as PCP and if in the future she can afford insurance she will schedule an annual f/u.

## 2019-11-04 ENCOUNTER — PES CALL (OUTPATIENT)
Dept: ADMINISTRATIVE | Facility: CLINIC | Age: 54
End: 2019-11-04

## 2024-11-07 ENCOUNTER — TELEPHONE (OUTPATIENT)
Dept: OTOLARYNGOLOGY | Facility: CLINIC | Age: 59
End: 2024-11-07
Payer: MEDICARE

## 2024-11-07 ENCOUNTER — OFFICE VISIT (OUTPATIENT)
Dept: OTOLARYNGOLOGY | Facility: CLINIC | Age: 59
End: 2024-11-07
Payer: MEDICARE

## 2024-11-07 ENCOUNTER — OFFICE VISIT (OUTPATIENT)
Dept: FAMILY MEDICINE | Facility: CLINIC | Age: 59
End: 2024-11-07
Payer: COMMERCIAL

## 2024-11-07 VITALS
OXYGEN SATURATION: 97 % | DIASTOLIC BLOOD PRESSURE: 84 MMHG | HEART RATE: 68 BPM | WEIGHT: 100.63 LBS | HEIGHT: 59 IN | BODY MASS INDEX: 20.28 KG/M2 | SYSTOLIC BLOOD PRESSURE: 142 MMHG | TEMPERATURE: 98 F

## 2024-11-07 VITALS — BODY MASS INDEX: 19.6 KG/M2 | HEIGHT: 59 IN | WEIGHT: 97.25 LBS

## 2024-11-07 DIAGNOSIS — R45.89 ANXIETY ABOUT HEALTH: Primary | ICD-10-CM

## 2024-11-07 DIAGNOSIS — F33.1 MODERATE EPISODE OF RECURRENT MAJOR DEPRESSIVE DISORDER: ICD-10-CM

## 2024-11-07 DIAGNOSIS — D36.10 SCHWANNOMA: ICD-10-CM

## 2024-11-07 DIAGNOSIS — R45.86 EMOTIONAL LABILITY: ICD-10-CM

## 2024-11-07 DIAGNOSIS — R51.9 FACIAL PAIN: Primary | ICD-10-CM

## 2024-11-07 DIAGNOSIS — J44.9 CHRONIC OBSTRUCTIVE PULMONARY DISEASE, UNSPECIFIED COPD TYPE: ICD-10-CM

## 2024-11-07 DIAGNOSIS — H90.3 ASYMMETRIC SNHL (SENSORINEURAL HEARING LOSS): ICD-10-CM

## 2024-11-07 DIAGNOSIS — I10 PRIMARY HYPERTENSION: ICD-10-CM

## 2024-11-07 PROCEDURE — 99999 PR PBB SHADOW E&M-EST. PATIENT-LVL V: CPT | Mod: PBBFAC,,, | Performed by: FAMILY MEDICINE

## 2024-11-07 PROCEDURE — 99204 OFFICE O/P NEW MOD 45 MIN: CPT | Mod: HCNC,S$GLB,, | Performed by: OTOLARYNGOLOGY

## 2024-11-07 PROCEDURE — 3008F BODY MASS INDEX DOCD: CPT | Mod: HCNC,CPTII,S$GLB, | Performed by: OTOLARYNGOLOGY

## 2024-11-07 PROCEDURE — 99999 PR PBB SHADOW E&M-EST. PATIENT-LVL III: CPT | Mod: PBBFAC,HCNC,, | Performed by: OTOLARYNGOLOGY

## 2024-11-07 PROCEDURE — 3079F DIAST BP 80-89 MM HG: CPT | Mod: HCNC,CPTII,S$GLB, | Performed by: FAMILY MEDICINE

## 2024-11-07 PROCEDURE — 3008F BODY MASS INDEX DOCD: CPT | Mod: HCNC,CPTII,S$GLB, | Performed by: FAMILY MEDICINE

## 2024-11-07 PROCEDURE — 99204 OFFICE O/P NEW MOD 45 MIN: CPT | Mod: HCNC,S$GLB,, | Performed by: FAMILY MEDICINE

## 2024-11-07 PROCEDURE — 3077F SYST BP >= 140 MM HG: CPT | Mod: HCNC,CPTII,S$GLB, | Performed by: FAMILY MEDICINE

## 2024-11-07 PROCEDURE — 1159F MED LIST DOCD IN RCRD: CPT | Mod: HCNC,CPTII,S$GLB, | Performed by: OTOLARYNGOLOGY

## 2024-11-07 RX ORDER — SERTRALINE HYDROCHLORIDE 50 MG/1
50 TABLET, FILM COATED ORAL DAILY
Qty: 30 TABLET | Refills: 2 | OUTPATIENT
Start: 2024-11-07 | End: 2025-11-07

## 2024-11-07 RX ORDER — ALBUTEROL SULFATE 90 UG/1
2 INHALANT RESPIRATORY (INHALATION) EVERY 6 HOURS PRN
Qty: 18 G | Refills: 0 | Status: SHIPPED | OUTPATIENT
Start: 2024-11-07 | End: 2025-11-07

## 2024-11-07 RX ORDER — AMLODIPINE BESYLATE 5 MG/1
5 TABLET ORAL DAILY
Qty: 30 TABLET | Refills: 2 | Status: SHIPPED | OUTPATIENT
Start: 2024-11-07 | End: 2025-11-07

## 2024-11-07 NOTE — PROGRESS NOTES
Subjective:       Patient ID: Any Coulter is a 59 y.o. female.    Chief Complaint: Cough, Shortness of Breath, Nasal Congestion, and Headache      HPI Comments:       Current Outpatient Medications:     BREO ELLIPTA 100-25 mcg/dose diskus inhaler, Inhale 1 puff into the lungs once daily. Controller, Disp: 60 each, Rfl: 2    clotrimazole-betamethasone 1-0.05% (LOTRISONE) cream, , Disp: , Rfl:     fluticasone (FLONASE) 50 mcg/actuation nasal spray, 2 sprays (100 mcg total) by Each Nare route daily as needed for Rhinitis., Disp: 16 g, Rfl: 5    predniSONE (DELTASONE) 20 MG tablet, Take 3 tab daily for 3 days; then take 2 tab daily for 3 day; then take 1 tab daily for 3 days; then take 0.5 tab daily for 4 days (Patient not taking: Reported on 11/7/2024), Disp: 20 tablet, Rfl: 0    albuterol (PROVENTIL/VENTOLIN HFA) 90 mcg/actuation inhaler, Inhale 2 puffs into the lungs every 6 (six) hours as needed for Wheezing. Rescue, Disp: 18 g, Rfl: 0    amLODIPine (NORVASC) 5 MG tablet, Take 1 tablet (5 mg total) by mouth once daily., Disp: 30 tablet, Rfl: 2    hydrocodone-acetaminophen 7.5-325mg (NORCO) 7.5-325 mg per tablet, , Disp: , Rfl:     loratadine (CLARITIN) 10 mg tablet, Take 1 tablet (10 mg total) by mouth once daily., Disp: 30 tablet, Rfl: 1    sertraline (ZOLOFT) 50 MG tablet, Take 1 tablet (50 mg total) by mouth once daily., Disp: 30 tablet, Rfl: 2      This my 1st time seeing her in over 6 years.  She has been getting her health care elsewhere for the most part.    Today she is very distraught.  Having trouble getting answers to questions she has has a about a facial injury that she sustained in 2019.  She was vacuuming and was pulling on the 2 when it struck her in the nose.  She broke some teeth in his had chronic pain in the facial area ever since.  She thinks it also dislocated her jaw.  She has been seeing chiropractor for some relief for some time.    ENT consult however found other unrelated problems:  "Schwannoma and cholesteatoma.  Various specialties in various times or getting involved, but she is not understanding what comes next and why they can not answer her concerns about pain.  When asked if she could reach out to them she said "no, I am mad at them".  This includes a ENT specialist in [place]    Due to her upset state today was difficult following all the details.  I offered her a 2nd opinion with ENT here at [facility] she accepted.      Chronic symptoms include sinus congestion.  Hearing loss, chronic clear sputum production.  She smokes less than half a pack a day    She is followed by Psychiatry at [facility] has been seen as recently as August.  She is still on the Zoloft that I started her on years ago.  I also reviewed some primary care notes from July.  She is on olmesartan for hypertension, Spiriva Advair for COPD.  Appropriate orders were placed at that time for mammogram, Pap smear, colonoscopy periods unclear whether she had these tests done      Review of Systems   Constitutional:  Negative for activity change, appetite change and fever.   HENT:  Positive for sinus pain. Negative for sore throat.    Respiratory:  Negative for cough and shortness of breath.    Cardiovascular:  Negative for chest pain.   Gastrointestinal:  Negative for abdominal pain, diarrhea and nausea.   Genitourinary:  Negative for difficulty urinating.   Musculoskeletal:  Negative for arthralgias and myalgias.   Neurological:  Negative for dizziness and headaches.   Psychiatric/Behavioral:  Positive for dysphoric mood. The patient is nervous/anxious.        Objective:      Vitals:    11/07/24 1047 11/07/24 1110   BP: (!) 152/76 (!) 142/84   Pulse: 68    Temp: 97.6 °F (36.4 °C)    TempSrc: Tympanic    SpO2: 97%    Weight: 45.7 kg (100 lb 10.2 oz)    Height: 4' 11" (1.499 m)    PainSc: 10-Worst pain ever      Physical Exam  Vitals and nursing note reviewed.   Constitutional:       General: She is not in acute distress.    "  Appearance: She is well-developed. She is not diaphoretic.   HENT:      Head: Normocephalic.      Mouth/Throat:      Pharynx: No oropharyngeal exudate.   Neck:      Thyroid: No thyromegaly.   Cardiovascular:      Rate and Rhythm: Normal rate and regular rhythm.      Heart sounds: Normal heart sounds. No murmur heard.  Pulmonary:      Effort: Pulmonary effort is normal.      Breath sounds: Normal breath sounds. No wheezing or rales.   Abdominal:      General: There is no distension.      Palpations: Abdomen is soft.   Musculoskeletal:      Cervical back: Neck supple.   Lymphadenopathy:      Cervical: No cervical adenopathy.   Skin:     General: Skin is warm and dry.   Neurological:      Mental Status: She is alert and oriented to person, place, and time.   Psychiatric:         Mood and Affect: Affect is tearful.         Speech: Speech is rapid and pressured.         Behavior: Behavior normal.         Thought Content: Thought content normal.         Judgment: Judgment normal.         Assessment:       1. Anxiety about health    2. Chronic obstructive pulmonary disease, unspecified COPD type    3. Primary hypertension    4. Moderate episode of recurrent major depressive disorder    5. Schwannoma        Plan:   Anxiety about health  Comments:  Second opinion on ENT problems.  Continue with psychiatry    Chronic obstructive pulmonary disease, unspecified COPD type  Comments:  Controlled with medications    Primary hypertension  Comments:  Appears Stable.  Due to her emotional state, it is difficult to assess the blood pressure today    Moderate episode of recurrent major depressive disorder  Comments:  Continue psychiatry/Zoloft    Schwannoma  Comments:  Being seen by neurosurgeon.  No longer any good communication.  ENT 2nd opinion  Orders:  -     Ambulatory referral/consult to ENT; Future; Expected date: 11/14/2024

## 2024-11-07 NOTE — PROGRESS NOTES
"Referring Provider:    Duncan Ojeda Md  [address] [place] [address]  Subjective:   Patient: Any Coulter [id number], :1965   Visit date:2024 3:13 PM    Chief Complaint:  Other (Referred by Duncan Ojeda for Schwannoma. Patient c/o being lightheaded and trouble hearing.)    HPI:    Prior notes reviewed by myself.  Clinical documentation obtained by nursing staff reviewed.     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  Her most concerning symptoms is left facial pressure that extends to the top of her head and down her neck. She has long hx of dental work on her upper front teeth and reports tenderness to her upper hard palate.     She also has pustules to the right post auricular area that has been there for about 2 years but is still bothering her and has not resolved with antibiotics. Her recently obtained an audiogram on 3/6/24 which asym hearing loss, has a component of conductive loss on the right and sensorineural on the left. She has hx of right ear surgery as a kid and does not recall details of the surgery. Got fitted for hearing aids but did not use due to tinnitus. Denies otorrhea or otalgia.   Today she is extremely emotionally labile.      Objective:     Physical Exam:  Vitals:  Ht 4' 11" (1.499 m)   Wt 44.1 kg (97 lb 3.6 oz)   BMI 19.64 kg/m²   General appearance:  Well developed, well nourished, intermittently tearful    Ears:  Otoscopy of external auditory canals and tympanic membranes was normal, clinical speech reception thresholds grossly intact, no mass/lesion of auricle.    Nose:  No masses/lesions of external nose, nasal mucosa, septum, and turbinates were within normal limits.    Mouth:  No mass/lesion of lips,  gums, hard/soft palate, tongue, tonsils, or oropharynx. Mixed dentition    Neck & Lymphatics:  No cervical lymphadenopathy, no neck mass/crepitus/ asymmetry, trachea is midline, no thyroid enlargement/tenderness/mass.    Neuro:  CN " II-XII intact bilaterally        [x]  Data Reviewed:    Lab Results   Component Value Date    WBC 8.0 03/22/2024    HGB 15.6 03/22/2024    HCT 46.3 (H) 03/22/2024    MCV 92.3 03/22/2024    EOSINOPHIL 1.9 08/24/2017         [x]  Independent interpretation of test: left CPA lesion consistent with VN  MRI IAC/Temporal Bones W W/O Contrast  Order: [id number]  Impression      Enhancing lesion in the left cerebellopontine angle extending into left internal auditory canal with central cystic change and a 3 mm fundal cap, favor vestibular schwannoma.    Electronically Signed By: Savana Osborn 5/3/2024 3:55 PM CDT  Narrative    EXAM: [facility] MRI IAC W WO CONTRAST    HISTORY: Asymmetric sensorineural hearing loss.    COMPARISON: Same-day temporal bone CT.    TECHNIQUE: Multiplanar multisequence MRI of the brain with special attention to the internal auditory canals performed without and with gadolinium based intravenous contrast: GADOBUTROL 10 MMOL/10 ML (1 MMOL/ML) INTRAVENOUS SOLUTION:10mmol    FINDINGS:    BRAIN: No restricted diffusion to indicate indicate acute infarct. No intracranial hemorrhage. Punctate foci of T2/FLAIR hyperintensity in the supratentorial white matter are nonspecific and within normal limits for patient's age. Proximal intracranial flow voids are maintained.  Ventricles are normal in caliber and configuration.    INTERNAL AUDITORY CANALS including 7th and 8th nerves: 1.1 x 1.0 x 0.6 cm enhancing lesion with central nonenhancement and T2 heterogeneity in the right cerebellopontine angle extending into the right internal auditory canal. There is a 3 mm fundal cap (series 901 image 55). The right facial and vestibulocochlear nerves are unremarkable.  COCHLEA: Normal  VESTIBULE: Normal  SEMICIRCULAR CANALS: Normal  ENDOLYMPHATIC SAC: Not dilated  Exam End: 05/03/24 14:49    Specimen Collected: 05/03/24 15:17 Last Resulted: 05/03/24 15:55   Received From: [facility]  Result Received: 11/07/24 09:59          Discussed with her PCP Dr. Ojeda  Audio not available for review  Reviewed [facility] notes, asymmetric SNHL on audiogram per resident documentation    Assessment & Plan:   Facial pain    Schwannoma  Comments:  Being seen by neurosurgeon.  No longer any good communication.  ENT 2nd opinion  Orders:  -     Ambulatory referral/consult to ENT    Emotional lability        We had a long discussion about her various symptoms.  I explained that her vestibular schwannoma was unlikely to be causing any of her pain.  She states that she has 10/10 pain, so I encouraged her to be seen in the emergency department for pain control.  We encouraged her to schedule an appointment with our neuro otologist regarding her schwannoma, but she insisted on being seen today.  She sees a mental health professional through [facility], but she became combative when I asked her about those visits/issues.  She understands that she can f/u with us when she is ready to discuss next steps for her schwannoma.      Colton Olmstead M.D.  Department of Otolaryngology - Head & Neck Surgery  [id number] [place] [address].  [place] [address]  P: [phone]  F: [phone]        DISCLAIMER: This note was prepared with [employer] voice recognition transcription software. Garbled syntax, mangled pronouns, and other bizarre constructions may be attributed to that software system. While efforts were made to correct any mistakes made by this voice recognition program, some errors and/or omissions may remain in the note that were missed when the note was originally created.

## 2024-11-07 NOTE — Clinical Note
"Hello -  I met with this patient today.  She was extremely emotional, crying and becoming manic at times.  She has a bunch of different complaints that she tries to connect as one issue.  I explained that her schwannoma was not causing any of this widespread pain that she has.   This elaborate story about punching herself in the face while trying to manipulate her vacuum .  She obviously has some significant dental issues, but I can't offer any help there.    I encouraged her to seek treatment at the ER due to her "10/10" pain, but I'm not sure if she is going to.  She seems like she needs some help on her supratentorial issues more than anything else.   We tried to have her meet up with our neurootologist regarding her schwannoma which she has, but she insisted on seeing me today.  She smelled pretty strongly of alcohol today.  Not sure if you have any contact with her mental health professional, but getting her in there seems like the next move.  Colton"

## 2024-11-25 NOTE — TELEPHONE ENCOUNTER
No care due was identified.  [facility] Embedded Care Due Messages. Reference number: [id number].   11/25/2024 5:02:16 PM CST

## 2024-11-25 NOTE — TELEPHONE ENCOUNTER
----- Message from Stacie sent at 11/25/2024 12:42 PM CST -----  .Type:  RX Refill Request    Who Called: .Any Coulter   Refill or New Rx:refill  RX Name and Strength:albuterol (PROVENTIL/VENTOLIN HFA) 90 mcg/actuation inhaler  How is the patient currently taking it? (ex. 1XDay):  Is this a 30 day or 90 day RX:    Preferred Pharmacy with phone number:.  [facility] [address] [facility] [address] [place] [address]  [address] [address] [address]  Phone: [phone] Fax: [phone]     Local or Mail Order:local  Ordering Provider:  Would the patient rather a call back or a response via [facility]? Call back  Best Call Back Number: [phone]  Additional Information:

## 2024-11-26 RX ORDER — ALBUTEROL SULFATE 90 UG/1
2 INHALANT RESPIRATORY (INHALATION) EVERY 6 HOURS PRN
Qty: 18 G | Refills: 3 | Status: SHIPPED | OUTPATIENT
Start: 2024-11-26 | End: 2025-11-26

## 2024-12-13 ENCOUNTER — LAB VISIT (OUTPATIENT)
Dept: LAB | Facility: HOSPITAL | Age: 59
End: 2024-12-13
Attending: FAMILY MEDICINE
Payer: MEDICARE

## 2024-12-13 ENCOUNTER — OFFICE VISIT (OUTPATIENT)
Dept: FAMILY MEDICINE | Facility: CLINIC | Age: 59
End: 2024-12-13
Payer: MEDICARE

## 2024-12-13 VITALS
SYSTOLIC BLOOD PRESSURE: 120 MMHG | DIASTOLIC BLOOD PRESSURE: 78 MMHG | TEMPERATURE: 99 F | BODY MASS INDEX: 18.31 KG/M2 | HEIGHT: 59 IN | WEIGHT: 90.81 LBS | RESPIRATION RATE: 16 BRPM | HEART RATE: 98 BPM | OXYGEN SATURATION: 98 %

## 2024-12-13 DIAGNOSIS — J44.9 CHRONIC OBSTRUCTIVE PULMONARY DISEASE, UNSPECIFIED COPD TYPE: ICD-10-CM

## 2024-12-13 DIAGNOSIS — D36.10 SCHWANNOMA: ICD-10-CM

## 2024-12-13 DIAGNOSIS — I10 PRIMARY HYPERTENSION: ICD-10-CM

## 2024-12-13 DIAGNOSIS — F32.A ANXIETY AND DEPRESSION: Primary | ICD-10-CM

## 2024-12-13 DIAGNOSIS — R51.9 FACIAL PAIN: ICD-10-CM

## 2024-12-13 DIAGNOSIS — R79.9 ABNORMAL FINDING OF BLOOD CHEMISTRY, UNSPECIFIED: ICD-10-CM

## 2024-12-13 DIAGNOSIS — F41.9 ANXIETY AND DEPRESSION: Primary | ICD-10-CM

## 2024-12-13 DIAGNOSIS — R45.89 ANXIETY ABOUT HEALTH: ICD-10-CM

## 2024-12-13 DIAGNOSIS — Z72.0 TOBACCO ABUSE: ICD-10-CM

## 2024-12-13 DIAGNOSIS — I70.0 AORTIC ATHEROSCLEROSIS: ICD-10-CM

## 2024-12-13 LAB
ESTIMATED AVG GLUCOSE: 111 MG/DL (ref 68–131)
HBA1C MFR BLD: 5.5 % (ref 4–5.6)

## 2024-12-13 PROCEDURE — 87389 HIV-1 AG W/HIV-1&-2 AB AG IA: CPT | Mod: HCNC | Performed by: FAMILY MEDICINE

## 2024-12-13 PROCEDURE — 99999 PR PBB SHADOW E&M-EST. PATIENT-LVL III: CPT | Mod: PBBFAC,HCNC,, | Performed by: FAMILY MEDICINE

## 2024-12-13 PROCEDURE — 83036 HEMOGLOBIN GLYCOSYLATED A1C: CPT | Mod: HCNC | Performed by: FAMILY MEDICINE

## 2024-12-13 PROCEDURE — 86803 HEPATITIS C AB TEST: CPT | Mod: HCNC | Performed by: FAMILY MEDICINE

## 2024-12-13 PROCEDURE — 36415 COLL VENOUS BLD VENIPUNCTURE: CPT | Mod: HCNC,PO | Performed by: FAMILY MEDICINE

## 2024-12-13 RX ORDER — SERTRALINE HYDROCHLORIDE 50 MG/1
50 TABLET, FILM COATED ORAL DAILY
Qty: 30 TABLET | Refills: 2 | Status: SHIPPED | OUTPATIENT
Start: 2024-12-13 | End: 2025-12-13

## 2024-12-13 NOTE — PROGRESS NOTES
Subjective:       Patient ID: Any Coulter is a 59 y.o. female.    Chief Complaint: Annual Exam      HPI Comments:       Current Outpatient Medications:     albuterol (PROVENTIL/VENTOLIN HFA) 90 mcg/actuation inhaler, Inhale 2 puffs into the lungs every 6 (six) hours as needed for Wheezing. Rescue, Disp: 18 g, Rfl: 3    amLODIPine (NORVASC) 5 MG tablet, Take 1 tablet (5 mg total) by mouth once daily., Disp: 30 tablet, Rfl: 2    BREO ELLIPTA 100-25 mcg/dose diskus inhaler, Inhale 1 puff into the lungs once daily. Controller, Disp: 60 each, Rfl: 2    clotrimazole-betamethasone 1-0.05% (LOTRISONE) cream, , Disp: , Rfl:     fluticasone (FLONASE) 50 mcg/actuation nasal spray, 2 sprays (100 mcg total) by Each Nare route daily as needed for Rhinitis., Disp: 16 g, Rfl: 5    hydrocodone-acetaminophen 7.5-325mg (NORCO) 7.5-325 mg per tablet, , Disp: , Rfl:     loratadine (CLARITIN) 10 mg tablet, Take 1 tablet (10 mg total) by mouth once daily., Disp: 30 tablet, Rfl: 1    predniSONE (DELTASONE) 20 MG tablet, Take 3 tab daily for 3 days; then take 2 tab daily for 3 day; then take 1 tab daily for 3 days; then take 0.5 tab daily for 4 days (Patient not taking: Reported on 12/13/2024), Disp: 20 tablet, Rfl: 0    sertraline (ZOLOFT) 50 MG tablet, Take 1 tablet (50 mg total) by mouth once daily., Disp: 30 tablet, Rfl: 2      This is our initial follow-up visit.  She establish care with me a month ago.  She reiterates today as she wants me to be her PCP    Today she is accompanied by her daughter who lives nearby    Patient is asking for referral for mental health.  It also refill on her Zoloft.    She apparently had a psychiatrist a few years ago but has not been seeing lately.    Today we only talked tangentially about her complete last time: Facial pain.  Sent her to ENT to get clarification on the nature of her problem.  She has a schwannoma.  She is also being followed by neuro ENT elsewhere    Smokes 1/2 pack per day.    Says  "she is not diabetic    Due for colonoscopy.  Wants to defer awhile longer      Review of Systems   Constitutional:  Negative for activity change, appetite change and fever.   HENT:  Negative for sore throat.    Respiratory:  Negative for cough and shortness of breath.    Cardiovascular:  Negative for chest pain.   Gastrointestinal:  Negative for abdominal pain, diarrhea and nausea.   Genitourinary:  Negative for difficulty urinating.   Musculoskeletal:  Negative for arthralgias and myalgias.   Neurological:  Negative for dizziness and headaches.   Psychiatric/Behavioral:  Positive for dysphoric mood. Negative for self-injury and suicidal ideas. The patient is nervous/anxious.        Objective:      Vitals:    12/13/24 1135   BP: 120/78   Pulse: 98   Resp: 16   Temp: 98.5 °F (36.9 °C)   TempSrc: Tympanic   SpO2: 98%   Weight: 41.2 kg (90 lb 13.3 oz)   Height: 4' 11" (1.499 m)     Physical Exam  Vitals and nursing note reviewed.   Constitutional:       General: She is not in acute distress.     Appearance: She is well-developed. She is not diaphoretic.   HENT:      Head: Normocephalic.   Neck:      Thyroid: No thyromegaly.   Cardiovascular:      Rate and Rhythm: Normal rate and regular rhythm.      Heart sounds: Normal heart sounds. No murmur heard.  Pulmonary:      Effort: Pulmonary effort is normal.      Breath sounds: Normal breath sounds. No wheezing or rales.   Abdominal:      General: There is no distension.      Palpations: Abdomen is soft.   Musculoskeletal:      Cervical back: Neck supple.   Lymphadenopathy:      Cervical: No cervical adenopathy.   Skin:     General: Skin is warm and dry.   Neurological:      Mental Status: She is alert and oriented to person, place, and time.   Psychiatric:         Mood and Affect: Mood normal.         Speech: Speech is rapid and pressured and tangential.         Behavior: Behavior normal.         Thought Content: Thought content normal.         Judgment: Judgment is " impulsive.         Assessment:       1. Anxiety and depression    2. Chronic obstructive pulmonary disease, unspecified COPD type    3. Primary hypertension    4. Anxiety about health    5. Aortic atherosclerosis    6. Facial pain    7. Schwannoma    8. Tobacco abuse    9. Abnormal finding of blood chemistry, unspecified        Plan:   Anxiety and depression  Comments:  Refill Zoloft.  Psychiatry and psychology consult    Chronic obstructive pulmonary disease, unspecified COPD type  Comments:  On Breo    Primary hypertension  Comments:  Controlled.  Orders:  -     Hemoglobin A1C; Future; Expected date: 12/13/2024    Anxiety about health  Comments:  Less anxious and impulsive today than last time  Orders:  -     HIV 1/2 Ag/Ab (4th Gen); Future; Expected date: 12/13/2024  -     Hepatitis C Antibody; Future; Expected date: 12/13/2024  -     Ambulatory referral/consult to Psychiatry; Future; Expected date: 12/20/2024  -     Ambulatory referral/consult to Psychology; Future; Expected date: 12/20/2024    Aortic atherosclerosis  Comments:  Will leads statin    Facial pain  Comments:  Continues.  Much less present during the visit than last time    Schwannoma  Comments:  Follow-up with ENT    Tobacco abuse  Comments:  Cessation is advised    Abnormal finding of blood chemistry, unspecified  -     Hemoglobin A1C; Future; Expected date: 12/13/2024    Other orders  -     sertraline (ZOLOFT) 50 MG tablet; Take 1 tablet (50 mg total) by mouth once daily.  Dispense: 30 tablet; Refill: 2

## 2024-12-14 LAB
HCV AB SERPL QL IA: NORMAL
HIV 1+2 AB+HIV1 P24 AG SERPL QL IA: NORMAL

## 2024-12-16 ENCOUNTER — TELEPHONE (OUTPATIENT)
Dept: FAMILY MEDICINE | Facility: CLINIC | Age: 59
End: 2024-12-16
Payer: MEDICARE

## 2024-12-16 NOTE — TELEPHONE ENCOUNTER
----- Message from Duncan Ojeda MD sent at 12/14/2024  4:29 AM CST -----  Blood work looks good.  No diabetes

## 2024-12-16 NOTE — TELEPHONE ENCOUNTER
Called patient with results of labs per Dr. Ojeda all labs are good no diabetes  I was successful with speaking to her she verbalized understanding. No further action needed.

## 2024-12-16 NOTE — TELEPHONE ENCOUNTER
Called patient I was not successful at speaking to her. I was able to leave a voice mail to call back.

## 2025-01-10 ENCOUNTER — TELEPHONE (OUTPATIENT)
Dept: FAMILY MEDICINE | Facility: CLINIC | Age: 60
End: 2025-01-10
Payer: MEDICARE

## 2025-01-10 NOTE — TELEPHONE ENCOUNTER
Called patient and patient states that she is back in town and ready to schedule for therapy. Informed patient that I would have to send them a message to schedule.    ----- Message from Baldemar Vogel sent at 1/10/2025 12:14 PM CST -----  Contact: [phone]  Caller is Requesting a call Back.  .          Caller: Any (patient)            Reason For Call: pt is requesting a call back to speak with provider or staff in regards to referral placed to  psychology              Best Call Back: [phone]

## 2025-01-14 ENCOUNTER — TELEPHONE (OUTPATIENT)
Dept: PSYCHIATRY | Facility: CLINIC | Age: 60
End: 2025-01-14
Payer: MEDICARE

## 2025-01-14 ENCOUNTER — TELEPHONE (OUTPATIENT)
Dept: FAMILY MEDICINE | Facility: CLINIC | Age: 60
End: 2025-01-14
Payer: MEDICARE

## 2025-01-14 DIAGNOSIS — Z00.00 ENCOUNTER FOR MEDICARE ANNUAL WELLNESS EXAM: ICD-10-CM

## 2025-01-14 NOTE — TELEPHONE ENCOUNTER
----- Message from Khushboo sent at 1/14/2025  2:20 PM CST -----  Name of Who is Calling:IAN HUIZAR [[id number]]        What is the request in detail:Pt requesting a call back from office if possible today.        Can the clinic reply by [facility]: Call        What Number to Call Back if not in [facility]:Telephone Information:  [employer]          [phone]

## 2025-01-14 NOTE — TELEPHONE ENCOUNTER
----- Message from Baldemar Beth sent at 1/10/2025  5:02 PM CST -----  Regarding: FW: Appointment    ----- Message -----  From: Pauline Chávez LPN  Sent: 1/10/2025   4:10 PM CST  To: [facility] Psychiatry Clinical Support Staff  Subject: Appointment                                      Good Afternoon,  This patient has not heard from anyone about setting up a new patient appointment.    Thank you,  Pauline

## 2025-01-15 ENCOUNTER — TELEPHONE (OUTPATIENT)
Dept: FAMILY MEDICINE | Facility: CLINIC | Age: 60
End: 2025-01-15
Payer: MEDICARE

## 2025-01-15 NOTE — TELEPHONE ENCOUNTER
----- Message from Gina sent at 1/15/2025  4:47 PM CST -----  Contact: Any  Type:  Patient Returning Call    Who Called:Any   Who Left Message for Patient:Khushboo   Does the patient know what this is regarding?:Yes   Would the patient rather a call back or a response via [facility]? Call back   Best Call Back Number:[phone]  Additional Information:

## 2025-01-15 NOTE — TELEPHONE ENCOUNTER
I reached out to the patient, who expressed a need to see psychiatry. A referral was made on 12/13/2024, but the patient has not yet been scheduled. I informed her that the psychiatry department handles their own scheduling and that I would send a message to them to request they contact her regarding an appointment date and time.

## 2025-01-16 ENCOUNTER — TELEPHONE (OUTPATIENT)
Dept: FAMILY MEDICINE | Facility: CLINIC | Age: 60
End: 2025-01-16
Payer: MEDICARE

## 2025-01-16 NOTE — TELEPHONE ENCOUNTER
----- Message from Med Assistant Ramires sent at 1/16/2025  8:58 AM CST -----  Regarding: RE: Stephani Castillo morning, patient has an appointment schedule. On Friday 3/7/25 @2:00 pm with Kenyon York for therapy. Patient decline medication management. Have a great day  ----- Message -----  From: Khushboo Keen CMA  Sent: 1/15/2025   4:59 PM CST  To: Sajan Vargas MA; Keli Keen MA; #  Subject: [facility]                                       Good Evening, please call patient schedule an appointment referral has been placed since 12/13/2025 patient is calling in regards to an appointment day and time.

## 2025-01-27 NOTE — TELEPHONE ENCOUNTER
Refill Routing Note   Medication(s) are not appropriate for processing by [facility] for the following reason(s):        New or recently adjusted medication    [facility] action(s):  Defer               Appointments  past 12m or future 3m with PCP    Date Provider   Last Visit   12/13/2024 Duncan Ojeda MD   Next Visit   Visit date not found Duncan Ojeda MD   ED visits in past 90 days: 0        Note composed:2:18 PM 01/27/2025

## 2025-01-27 NOTE — TELEPHONE ENCOUNTER
No care due was identified.  [facility] Embedded Care Due Messages. Reference number: [id number].   1/27/2025 6:52:37 AM CST

## 2025-01-28 ENCOUNTER — TELEPHONE (OUTPATIENT)
Dept: PSYCHIATRY | Facility: CLINIC | Age: 60
End: 2025-01-28
Payer: MEDICARE

## 2025-01-28 NOTE — TELEPHONE ENCOUNTER
----- Message from Baldemar Cuello sent at 1/15/2025  4:52 PM CST -----  Regarding: [facility]  Good Evening, please call patient schedule an appointment referral has been placed since 12/13/2025 patient is calling in regards to an appointment day and time.

## 2025-01-30 RX ORDER — AMLODIPINE BESYLATE 5 MG/1
5 TABLET ORAL
Qty: 90 TABLET | Refills: 3 | Status: SHIPPED | OUTPATIENT
Start: 2025-01-30

## 2025-03-10 RX ORDER — SERTRALINE HYDROCHLORIDE 50 MG/1
50 TABLET, FILM COATED ORAL
Qty: 90 TABLET | Refills: 2 | Status: SHIPPED | OUTPATIENT
Start: 2025-03-10

## 2025-03-10 NOTE — TELEPHONE ENCOUNTER
Refill Decision Note   Any Yfn  is requesting a refill authorization.  Brief Assessment and Rationale for Refill:  Approve     Medication Therapy Plan:         Comments:     Note composed:1:20 PM 03/10/2025

## 2025-03-10 NOTE — TELEPHONE ENCOUNTER
No care due was identified.  [facility] Embedded Care Due Messages. Reference number: [id number].   3/10/2025 6:52:13 AM CDT